# Patient Record
Sex: FEMALE | Race: WHITE | NOT HISPANIC OR LATINO | URBAN - METROPOLITAN AREA
[De-identification: names, ages, dates, MRNs, and addresses within clinical notes are randomized per-mention and may not be internally consistent; named-entity substitution may affect disease eponyms.]

---

## 2017-09-10 ENCOUNTER — EMERGENCY (EMERGENCY)
Facility: HOSPITAL | Age: 22
LOS: 0 days | Discharge: HOME | End: 2017-09-10
Admitting: FAMILY MEDICINE
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
## 2017-09-10 DIAGNOSIS — S05.92XA UNSPECIFIED INJURY OF LEFT EYE AND ORBIT, INITIAL ENCOUNTER: ICD-10-CM

## 2017-09-10 DIAGNOSIS — S09.93XA UNSPECIFIED INJURY OF FACE, INITIAL ENCOUNTER: ICD-10-CM

## 2017-09-10 DIAGNOSIS — J45.909 UNSPECIFIED ASTHMA, UNCOMPLICATED: ICD-10-CM

## 2017-09-10 DIAGNOSIS — R22.0 LOCALIZED SWELLING, MASS AND LUMP, HEAD: ICD-10-CM
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
## 2018-04-07 ENCOUNTER — OUTPATIENT (OUTPATIENT)
Dept: OUTPATIENT SERVICES | Facility: HOSPITAL | Age: 23
LOS: 1 days | Discharge: HOME | End: 2018-04-07

## 2018-04-07 DIAGNOSIS — M54.5 LOW BACK PAIN: ICD-10-CM

## 2018-04-07 DIAGNOSIS — M54.2 CERVICALGIA: ICD-10-CM

## 2021-03-26 ENCOUNTER — EMERGENCY (EMERGENCY)
Facility: HOSPITAL | Age: 26
LOS: 0 days | Discharge: HOME | End: 2021-03-26
Attending: EMERGENCY MEDICINE | Admitting: EMERGENCY MEDICINE
Payer: COMMERCIAL

## 2021-03-26 VITALS
RESPIRATION RATE: 16 BRPM | HEART RATE: 89 BPM | SYSTOLIC BLOOD PRESSURE: 136 MMHG | TEMPERATURE: 99 F | OXYGEN SATURATION: 98 % | DIASTOLIC BLOOD PRESSURE: 91 MMHG | WEIGHT: 210.1 LBS

## 2021-03-26 DIAGNOSIS — M54.2 CERVICALGIA: ICD-10-CM

## 2021-03-26 DIAGNOSIS — R51.9 HEADACHE, UNSPECIFIED: ICD-10-CM
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
## 2021-03-26 DIAGNOSIS — S06.0X0A CONCUSSION WITHOUT LOSS OF CONSCIOUSNESS, INITIAL ENCOUNTER: ICD-10-CM
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
## 2021-03-26 PROCEDURE — 70450 CT HEAD/BRAIN W/O DYE: CPT | Mod: 26

## 2021-03-26 PROCEDURE — 99284 EMERGENCY DEPT VISIT MOD MDM: CPT

## 2021-03-26 RX ORDER — METHOCARBAMOL 500 MG/1
2 TABLET, FILM COATED ORAL
Qty: 24 | Refills: 0
Start: 2021-03-26 | End: 2021-03-28

## 2021-03-26 RX ORDER — MECLIZINE HCL 12.5 MG
1 TABLET ORAL
Qty: 12 | Refills: 0
Start: 2021-03-26 | End: 2021-03-29

## 2021-03-26 RX ORDER — IBUPROFEN 200 MG
1 TABLET ORAL
Qty: 16 | Refills: 0
Start: 2021-03-26 | End: 2021-03-29

## 2021-03-26 RX ORDER — IBUPROFEN 200 MG
600 TABLET ORAL ONCE
Refills: 0 | Status: COMPLETED | OUTPATIENT
Start: 2021-03-26 | End: 2021-03-26

## 2021-03-26 RX ORDER — MECLIZINE HCL 12.5 MG
50 TABLET ORAL ONCE
Refills: 0 | Status: COMPLETED | OUTPATIENT
Start: 2021-03-26 | End: 2021-03-26

## 2021-03-26 RX ADMIN — Medication 600 MILLIGRAM(S): at 14:33

## 2021-03-26 NOTE — ED PROVIDER NOTE - CARE PROVIDER_API CALL
Cadence Chowdhury (PhD)  [facility]  [address] [place] [address]  Phone: [phone]  Fax: [phone]  Follow Up Time:

## 2021-03-26 NOTE — ED PROVIDER NOTE - NS ED ROS FT
Constitutional: No altered mental status.  Eyes: No visual changes.  ENT: No hearing loss.  Neck: No neck pain or stiffness.  Cardiovascular: No chest pain, palpitations.  Pulmonary: No SOB. No hemoptysis.  Abdominal: +nausea.   : No hematuria.  Neuro: +headache. +tongue paresthesias.   MS: No back pain. No deformities.  Psych: No suicidal ideations.
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
## 2021-03-26 NOTE — ED PROVIDER NOTE - PROGRESS NOTE DETAILS
I have reviewed and discussed the medical student’s documentation and findings with the student. After personally examining the patient, my findings have been added to this documentation. -DC
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
## 2021-03-26 NOTE — ED ADULT NURSE NOTE - NSIMPLEMENTINTERV_GEN_ALL_ED
Implemented All Universal Safety Interventions:  [place] to call system. Call bell, personal items and telephone within reach. Instruct patient to call for assistance. Room bathroom lighting operational. Non-slip footwear when patient is off stretcher. Physically safe environment: no spills, clutter or unnecessary equipment. Stretcher in lowest position, wheels locked, appropriate side rails in place.

## 2021-03-26 NOTE — ED PROVIDER NOTE - PHYSICAL EXAMINATION
Constitutional: Well developed, well nourished. NAD  TRAUMA: ABC intact. GCS 15.   Head: Normocephalic, atraumatic.  Eyes: PERRL. EOMI. No Raccoon eyes.   ENT: No nasal discharge. No septal hematoma. No Salazar sign. Mucous membranes moist.  Neck: Supple. Painless ROM. No midline tenderness, stepoffs.  Cardiovascular: Normal S1, S2. Regular rate and rhythm. No murmurs, rubs, or gallops.  Pulmonary: Normal respiratory rate and effort. Lungs clear to auscultation bilaterally. No wheezing, rales, or rhonchi.  CHEST: No chest wall tenderness, crepitus.  Abdominal: Soft. Nondistended. Nontender. No rebound, guarding, rigidity.  BACK: No midline T/L/S tenderness, stepoffs. No saddle paresthesia.  Extremities: Pelvis stable. No traumatic deformities, tenderness of extremities.  Skin: No rashes, cyanosis, lacerations, abrasions.  Neuro: AAOx3. Strength 5/5 in all extremities. Sensation intact throughout. No focal neurological deficits.   Psych: Normal mood. Normal affect.
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
